# Patient Record
Sex: MALE | Race: WHITE | NOT HISPANIC OR LATINO | URBAN - METROPOLITAN AREA
[De-identification: names, ages, dates, MRNs, and addresses within clinical notes are randomized per-mention and may not be internally consistent; named-entity substitution may affect disease eponyms.]

---

## 2023-12-11 ENCOUNTER — INPATIENT HOSPITAL (INPATIENT)
Dept: URBAN - METROPOLITAN AREA HOSPITAL 50 | Facility: HOSPITAL | Age: 73
End: 2023-12-11
Payer: MEDICARE

## 2023-12-11 DIAGNOSIS — D64.9 ANEMIA, UNSPECIFIED: ICD-10-CM

## 2023-12-11 DIAGNOSIS — R63.4 ABNORMAL WEIGHT LOSS: ICD-10-CM

## 2023-12-11 PROCEDURE — 99222 1ST HOSP IP/OBS MODERATE 55: CPT | Performed by: INTERNAL MEDICINE

## 2023-12-12 ENCOUNTER — INPATIENT HOSPITAL (INPATIENT)
Dept: URBAN - METROPOLITAN AREA HOSPITAL 50 | Facility: HOSPITAL | Age: 73
End: 2023-12-12
Payer: MEDICARE

## 2023-12-12 DIAGNOSIS — R63.4 ABNORMAL WEIGHT LOSS: ICD-10-CM

## 2023-12-12 DIAGNOSIS — D50.0 IRON DEFICIENCY ANEMIA SECONDARY TO BLOOD LOSS (CHRONIC): ICD-10-CM

## 2023-12-12 PROCEDURE — 99232 SBSQ HOSP IP/OBS MODERATE 35: CPT | Mod: FS | Performed by: CLINICAL NURSE SPECIALIST

## 2024-06-28 ENCOUNTER — DOCUMENTATION (OUTPATIENT)
Dept: TRANSPLANT | Facility: HOSPITAL | Age: 74
End: 2024-06-28
Payer: MEDICARE

## 2024-06-28 ENCOUNTER — TELEPHONE (OUTPATIENT)
Dept: TRANSPLANT | Facility: HOSPITAL | Age: 74
End: 2024-06-28
Payer: MEDICARE

## 2024-06-28 VITALS — WEIGHT: 130 LBS | HEIGHT: 66 IN | BODY MASS INDEX: 20.89 KG/M2

## 2024-06-28 DIAGNOSIS — Z01.818 PRE-TRANSPLANT EVALUATION FOR KIDNEY TRANSPLANT: Primary | ICD-10-CM

## 2024-06-28 NOTE — TELEPHONE ENCOUNTER
Do you have difficulty reading or writing in English?   no   What is the primary cause of your kidney disease?  Unknown at the moment  Are you currently on dialysis?   yes  How many years have you been on dialysis?  1 month on dialysis  If yes, what days do you have your dialysis treatments?   Mon, Wed, Fri  Have you received a transplant before?   no  If yes, what organ, and when and where was your transplant?   N/A  Have you been diagnosed with diabetes?    no  Have you tested positive for hepatitis or HIV?   no  Have you ever been diagnosed with cancer?   no  If yes, what type of cancer, and when and where were you treated?   N/A  Do you have a history of a heart attack or stroke? No  Are you currently or have you previously been seen by a mental health professional?   no  If yes, what is the name of your mental health provider?   N/A  Are you a current or former tobacco user?   yes- currently smoking  Do you have history of alcohol abuse or dependence?   no  Do you have a history of illegal drug abuse or dependence?   no  Has anyone told you they're willing to donate their kidney to you?   yes, family members  What is your blood type?  unknown  Comments:   currently switching dialysis units from [facility] phone: [phone] fax: [phone]  Records (2728, neph, and compliance) will be requested.    Pcp: Dr. Leonardo Resendez [phone] [phone]    Neph: Dr. Hamilton Hart [phone] works at dialysis unit at   [facility]    Pt's daughter was given Living Donor website to register

## 2024-07-01 ENCOUNTER — TELEPHONE (OUTPATIENT)
Dept: TRANSPLANT | Facility: HOSPITAL | Age: 74
End: 2024-07-01
Payer: MEDICARE

## 2024-10-23 ENCOUNTER — DOCUMENTATION (OUTPATIENT)
Dept: TRANSPLANT | Facility: HOSPITAL | Age: 74
End: 2024-10-23
Payer: MEDICARE

## 2024-10-24 ENCOUNTER — DOCUMENTATION (OUTPATIENT)
Dept: TRANSPLANT | Facility: HOSPITAL | Age: 74
End: 2024-10-24
Payer: MEDICARE

## 2024-10-24 NOTE — Clinical Note
Per EV [employer] HMO a/e 05/01/24 no ded 275.00 IP copay days 1-4 3500.00 oop max.  Opened case with [employer]  & faxed clinical to  pending ref# [id number].

## 2024-10-24 NOTE — PROGRESS NOTES
Per EV [employer] H[employer] a/e 05/01/24 no ded 275.00 IP copay days 1-4 3500.00 oop max.  Opened case with [employer]  & faxed clinical to  pending ref# [id number].

## 2024-10-29 ENCOUNTER — TELEPHONE (OUTPATIENT)
Dept: TRANSPLANT | Facility: HOSPITAL | Age: 74
End: 2024-10-29
Payer: MEDICARE

## 2024-10-29 ENCOUNTER — APPOINTMENT (OUTPATIENT)
Dept: TRANSPLANT | Facility: HOSPITAL | Age: 74
End: 2024-10-29
Payer: MEDICARE

## 2024-10-30 ENCOUNTER — DOCUMENTATION (OUTPATIENT)
Dept: TRANSPLANT | Facility: HOSPITAL | Age: 74
End: 2024-10-30
Payer: MEDICARE

## 2024-11-11 ENCOUNTER — DOCUMENTATION (OUTPATIENT)
Dept: TRANSPLANT | Facility: HOSPITAL | Age: 74
End: 2024-11-11
Payer: MEDICARE

## 2024-11-11 NOTE — PROGRESS NOTES
TRANSPLANT REFERRAL REVIEW NOTE    Date: 11/11/2024  Time: 2:22 PM    Kit Hollingsworth  was referred by: No referring provider defined for this encounter.     Nephrologist: Dr Lopez  Dialysis unit: [facility]     This kidney transplant referral was reviewed by:    Surgeon: DR. ARCHIE ALONSO  Nephrologist: N/A    The recommendation is to proceed with STANDARD kidney transplant evaluation    Had a recent stress and LHC - please import. Seems no intervention was indicated due to collaterals    Reason referral closure: NA    Task was sent to  to scheduled or close referral as above : YES

## 2025-01-20 ENCOUNTER — TELEPHONE (OUTPATIENT)
Facility: HOSPITAL | Age: 75
End: 2025-01-20
Payer: MEDICARE

## 2025-01-20 NOTE — TELEPHONE ENCOUNTER
Confirmed kidney eval appt day for: Patient will reschedule  Reviewed appointment plans for the day: YES/NO  Challenges with transportation to appointments? YES/NO  This could feel like a long day…Any concerns? YES/NO  Bringing support person: YES/NO

## 2025-01-21 ENCOUNTER — APPOINTMENT (OUTPATIENT)
Facility: HOSPITAL | Age: 75
End: 2025-01-21
Payer: MEDICARE

## 2025-04-03 DIAGNOSIS — M16.11 PRIMARY OSTEOARTHRITIS OF RIGHT HIP: Primary | ICD-10-CM

## 2025-04-07 DIAGNOSIS — M16.11 PRIMARY OSTEOARTHRITIS OF RIGHT HIP: Primary | ICD-10-CM

## 2025-04-08 ENCOUNTER — TELEPHONE (OUTPATIENT)
Dept: PAIN MEDICINE | Facility: CLINIC | Age: 75
End: 2025-04-08
Payer: MEDICARE

## 2025-04-10 ENCOUNTER — APPOINTMENT (OUTPATIENT)
Dept: PAIN MEDICINE | Facility: CLINIC | Age: 75
End: 2025-04-10
Payer: MEDICARE

## 2025-04-17 ENCOUNTER — DOCUMENTATION (OUTPATIENT)
Dept: TRANSPLANT | Facility: HOSPITAL | Age: 75
End: 2025-04-17
Payer: MEDICARE

## 2025-07-14 ENCOUNTER — DOCUMENTATION (OUTPATIENT)
Facility: HOSPITAL | Age: 75
End: 2025-07-14
Payer: MEDICARE

## 2025-07-14 ENCOUNTER — TELEPHONE (OUTPATIENT)
Facility: HOSPITAL | Age: 75
End: 2025-07-14
Payer: MEDICARE

## 2025-07-14 VITALS — BODY MASS INDEX: 20.82 KG/M2 | HEIGHT: 68 IN | WEIGHT: 137.35 LBS

## 2025-07-14 NOTE — PROGRESS NOTES
Pre-Kidney Intake Questionnaire    1. Do you live in an assisted living/nursing facility? No  2. Do you have difficulty reading or writing in English? No  3. Do you have transportation to and from you medical appointments? Yes.  What type of transportation do you use? Car, pt drives, or daughter  4. Do you have a family member or friend who can accompany you to the appointment? Yes  5. Who is your primary care doctor?  Dr. Leonardo Resendez  6. What is the cause of your kidney disease? High Blood Pressure, Meds  7. Who is your kidney doctor?  Dr. Hamilton Lopez  8. Are you currently on dialysis? Yes. What type?  PD.  What days?  PD.  How many years?  1 1/2 Yrs  9. Do you currently have any open sores or wounds? No  10. Have you ever had an amputation? No  11. Have you ever been diagnosed with cancer? No  12. Do you have a history of heart attack or stroke? No  13. Are you currently wearing oxygen? No  14. Have you been hospitalized for any reason in the last year?  No  15. Do you have a potential living donor?  Yes, Daughter    Comments Intake complete

## 2025-07-17 ENCOUNTER — DOCUMENTATION (OUTPATIENT)
Facility: HOSPITAL | Age: 75
End: 2025-07-17
Payer: MEDICARE

## 2025-07-17 NOTE — PROGRESS NOTES
Patient was reviewed at Patient Review Meeting on 07/17/25 with Dr. Schreiber, Dr. Schaefer, and Dr. Leonardo and HARIKA Giles, SALLIE Abel, and SALLIE Mcgill.  Per review, patient can be scheduled to see a provider in a high risk clinic.    Patient will be called and scheduled in appropriate clinic per review.

## 2025-07-21 ENCOUNTER — TELEPHONE (OUTPATIENT)
Facility: HOSPITAL | Age: 75
End: 2025-07-21
Payer: MEDICARE

## 2025-07-21 DIAGNOSIS — N18.6 ESRD (END STAGE RENAL DISEASE) (MULTI): Primary | ICD-10-CM

## 2025-08-12 ENCOUNTER — OFFICE VISIT (OUTPATIENT)
Facility: HOSPITAL | Age: 75
End: 2025-08-12
Payer: MEDICARE

## 2025-08-12 VITALS
HEIGHT: 66 IN | HEART RATE: 96 BPM | OXYGEN SATURATION: 95 % | BODY MASS INDEX: 24.04 KG/M2 | WEIGHT: 149.6 LBS | DIASTOLIC BLOOD PRESSURE: 75 MMHG | SYSTOLIC BLOOD PRESSURE: 128 MMHG | TEMPERATURE: 97.8 F

## 2025-08-12 VITALS
BODY MASS INDEX: 24.15 KG/M2 | DIASTOLIC BLOOD PRESSURE: 75 MMHG | SYSTOLIC BLOOD PRESSURE: 128 MMHG | TEMPERATURE: 97.8 F | OXYGEN SATURATION: 95 % | WEIGHT: 149.6 LBS | HEART RATE: 96 BPM

## 2025-08-12 DIAGNOSIS — N18.6 ESRD (END STAGE RENAL DISEASE) (MULTI): ICD-10-CM

## 2025-08-12 DIAGNOSIS — Z01.818 PRE-TRANSPLANT EVALUATION FOR KIDNEY TRANSPLANT: Primary | ICD-10-CM

## 2025-08-12 ASSESSMENT — PAIN SCALES - GENERAL
PAINLEVEL_OUTOF10: 0-NO PAIN
PAINLEVEL_OUTOF10: 0-NO PAIN

## 2025-08-14 PROBLEM — N18.6 ESRD (END STAGE RENAL DISEASE) (MULTI): Status: ACTIVE | Noted: 2025-08-14

## 2025-08-18 ENCOUNTER — DOCUMENTATION (OUTPATIENT)
Facility: HOSPITAL | Age: 75
End: 2025-08-18
Payer: MEDICARE